# Patient Record
Sex: FEMALE | Race: WHITE | NOT HISPANIC OR LATINO | Employment: OTHER | URBAN - METROPOLITAN AREA
[De-identification: names, ages, dates, MRNs, and addresses within clinical notes are randomized per-mention and may not be internally consistent; named-entity substitution may affect disease eponyms.]

---

## 2023-12-19 ENCOUNTER — APPOINTMENT (EMERGENCY)
Dept: RADIOLOGY | Facility: HOSPITAL | Age: 73
End: 2023-12-19
Payer: MEDICARE

## 2023-12-19 ENCOUNTER — HOSPITAL ENCOUNTER (OUTPATIENT)
Facility: HOSPITAL | Age: 73
Setting detail: OBSERVATION
Discharge: HOME | End: 2023-12-20
Attending: INTERNAL MEDICINE | Admitting: INTERNAL MEDICINE
Payer: MEDICARE

## 2023-12-19 ENCOUNTER — APPOINTMENT (OUTPATIENT)
Dept: RADIOLOGY | Facility: HOSPITAL | Age: 73
End: 2023-12-19
Payer: MEDICARE
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
## 2023-12-19 DIAGNOSIS — R55 SYNCOPE AND COLLAPSE: ICD-10-CM

## 2023-12-19 PROBLEM — R56.9 WITNESSED SEIZURE-LIKE ACTIVITY (HCC): Status: ACTIVE | Noted: 2023-12-19

## 2023-12-19 PROBLEM — Z85.528 HISTORY OF RENAL CELL CANCER: Status: ACTIVE | Noted: 2023-12-19

## 2023-12-19 PROBLEM — E78.5 DYSLIPIDEMIA: Status: ACTIVE | Noted: 2023-12-19

## 2023-12-19 PROBLEM — E03.9 HYPOTHYROID: Status: ACTIVE | Noted: 2023-12-19

## 2023-12-19 LAB
2HR DELTA HS TROPONIN: 3 NG/L
ABO GROUP BLD: NORMAL
ALBUMIN SERPL BCP-MCNC: 3.9 G/DL (ref 3.5–5)
ALP SERPL-CCNC: 64 U/L (ref 34–104)
ALT SERPL W P-5'-P-CCNC: 8 U/L (ref 7–52)
ANION GAP SERPL CALCULATED.3IONS-SCNC: 8 MMOL/L
APTT PPP: 28 SECONDS (ref 23–37)
AST SERPL W P-5'-P-CCNC: 13 U/L (ref 13–39)
BASOPHILS # BLD AUTO: 0.03 THOUSANDS/ÂΜL (ref 0–0.1)
BASOPHILS NFR BLD AUTO: 0 % (ref 0–1)
BILIRUB SERPL-MCNC: 0.47 MG/DL (ref 0.2–1)
BLD GP AB SCN SERPL QL: NEGATIVE
BUN SERPL-MCNC: 25 MG/DL (ref 5–25)
CALCIUM SERPL-MCNC: 8.9 MG/DL (ref 8.4–10.2)
CARDIAC TROPONIN I PNL SERPL HS: 6 NG/L
CARDIAC TROPONIN I PNL SERPL HS: 9 NG/L
CHLORIDE SERPL-SCNC: 107 MMOL/L (ref 96–108)
CO2 SERPL-SCNC: 21 MMOL/L (ref 21–32)
CREAT SERPL-MCNC: 0.92 MG/DL (ref 0.6–1.3)
EOSINOPHIL # BLD AUTO: 0.02 THOUSAND/ÂΜL (ref 0–0.61)
EOSINOPHIL NFR BLD AUTO: 0 % (ref 0–6)
ERYTHROCYTE [DISTWIDTH] IN BLOOD BY AUTOMATED COUNT: 13.1 % (ref 11.6–15.1)
GFR SERPL CREATININE-BSD FRML MDRD: 61 ML/MIN/1.73SQ M
GLUCOSE SERPL-MCNC: 131 MG/DL (ref 65–140)
HCT VFR BLD AUTO: 42 % (ref 34.8–46.1)
HGB BLD-MCNC: 13.9 G/DL (ref 11.5–15.4)
IMM GRANULOCYTES # BLD AUTO: 0.06 THOUSAND/UL (ref 0–0.2)
IMM GRANULOCYTES NFR BLD AUTO: 1 % (ref 0–2)
INR PPP: 1.05 (ref 0.84–1.19)
LYMPHOCYTES # BLD AUTO: 0.7 THOUSANDS/ÂΜL (ref 0.6–4.47)
LYMPHOCYTES NFR BLD AUTO: 8 % (ref 14–44)
MCH RBC QN AUTO: 29.6 PG (ref 26.8–34.3)
MCHC RBC AUTO-ENTMCNC: 33.1 G/DL (ref 31.4–37.4)
MCV RBC AUTO: 89 FL (ref 82–98)
MONOCYTES # BLD AUTO: 0.28 THOUSAND/ÂΜL (ref 0.17–1.22)
MONOCYTES NFR BLD AUTO: 3 % (ref 4–12)
NEUTROPHILS # BLD AUTO: 8.21 THOUSANDS/ÂΜL (ref 1.85–7.62)
NEUTS SEG NFR BLD AUTO: 88 % (ref 43–75)
NRBC BLD AUTO-RTO: 0 /100 WBCS
PLATELET # BLD AUTO: 150 THOUSANDS/UL (ref 149–390)
PLATELET # BLD AUTO: 226 THOUSANDS/UL (ref 149–390)
PMV BLD AUTO: 10.3 FL (ref 8.9–12.7)
PMV BLD AUTO: 10.5 FL (ref 8.9–12.7)
POTASSIUM SERPL-SCNC: 4.5 MMOL/L (ref 3.5–5.3)
PROT SERPL-MCNC: 6.2 G/DL (ref 6.4–8.4)
PROTHROMBIN TIME: 13.6 SECONDS (ref 11.6–14.5)
RBC # BLD AUTO: 4.7 MILLION/UL (ref 3.81–5.12)
RH BLD: POSITIVE
SODIUM SERPL-SCNC: 136 MMOL/L (ref 135–147)
SPECIMEN EXPIRATION DATE: NORMAL
WBC # BLD AUTO: 9.3 THOUSAND/UL (ref 4.31–10.16)

## 2023-12-19 PROCEDURE — 99223 1ST HOSP IP/OBS HIGH 75: CPT | Performed by: INTERNAL MEDICINE

## 2023-12-19 PROCEDURE — 86850 RBC ANTIBODY SCREEN: CPT | Performed by: INTERNAL MEDICINE

## 2023-12-19 PROCEDURE — 70450 CT HEAD/BRAIN W/O DYE: CPT

## 2023-12-19 PROCEDURE — 86900 BLOOD TYPING SEROLOGIC ABO: CPT | Performed by: INTERNAL MEDICINE

## 2023-12-19 PROCEDURE — EDAIR PR ED AIR: Performed by: EMERGENCY MEDICINE

## 2023-12-19 PROCEDURE — 99285 EMERGENCY DEPT VISIT HI MDM: CPT

## 2023-12-19 PROCEDURE — 85049 AUTOMATED PLATELET COUNT: CPT | Performed by: INTERNAL MEDICINE

## 2023-12-19 PROCEDURE — 72125 CT NECK SPINE W/O DYE: CPT

## 2023-12-19 PROCEDURE — 76705 ECHO EXAM OF ABDOMEN: CPT | Performed by: SURGERY

## 2023-12-19 PROCEDURE — 84484 ASSAY OF TROPONIN QUANT: CPT | Performed by: INTERNAL MEDICINE

## 2023-12-19 PROCEDURE — 71045 X-RAY EXAM CHEST 1 VIEW: CPT

## 2023-12-19 PROCEDURE — 85730 THROMBOPLASTIN TIME PARTIAL: CPT | Performed by: INTERNAL MEDICINE

## 2023-12-19 PROCEDURE — 36415 COLL VENOUS BLD VENIPUNCTURE: CPT | Performed by: SURGERY

## 2023-12-19 PROCEDURE — 93308 TTE F-UP OR LMTD: CPT | Performed by: SURGERY

## 2023-12-19 PROCEDURE — 93005 ELECTROCARDIOGRAM TRACING: CPT

## 2023-12-19 PROCEDURE — 86901 BLOOD TYPING SEROLOGIC RH(D): CPT | Performed by: INTERNAL MEDICINE

## 2023-12-19 PROCEDURE — 85025 COMPLETE CBC W/AUTO DIFF WBC: CPT | Performed by: INTERNAL MEDICINE

## 2023-12-19 PROCEDURE — 80053 COMPREHEN METABOLIC PANEL: CPT | Performed by: INTERNAL MEDICINE

## 2023-12-19 PROCEDURE — 85610 PROTHROMBIN TIME: CPT | Performed by: INTERNAL MEDICINE

## 2023-12-19 RX ORDER — DOCUSATE SODIUM 100 MG/1
100 CAPSULE, LIQUID FILLED ORAL 2 TIMES DAILY
Status: DISCONTINUED | OUTPATIENT
Start: 2023-12-19 | End: 2023-12-20 | Stop reason: HOSPADM

## 2023-12-19 RX ORDER — ONDANSETRON 2 MG/ML
4 INJECTION INTRAMUSCULAR; INTRAVENOUS EVERY 6 HOURS PRN
Status: DISCONTINUED | OUTPATIENT
Start: 2023-12-19 | End: 2023-12-20 | Stop reason: HOSPADM

## 2023-12-19 RX ORDER — ACETAMINOPHEN 325 MG/1
650 TABLET ORAL EVERY 6 HOURS PRN
Status: DISCONTINUED | OUTPATIENT
Start: 2023-12-19 | End: 2023-12-20 | Stop reason: HOSPADM

## 2023-12-19 RX ORDER — ENOXAPARIN SODIUM 100 MG/ML
40 INJECTION SUBCUTANEOUS DAILY
Status: DISCONTINUED | OUTPATIENT
Start: 2023-12-20 | End: 2023-12-20 | Stop reason: HOSPADM

## 2023-12-19 RX ORDER — MAGNESIUM HYDROXIDE/ALUMINUM HYDROXICE/SIMETHICONE 120; 1200; 1200 MG/30ML; MG/30ML; MG/30ML
30 SUSPENSION ORAL EVERY 6 HOURS PRN
Status: DISCONTINUED | OUTPATIENT
Start: 2023-12-19 | End: 2023-12-20 | Stop reason: HOSPADM

## 2023-12-19 RX ADMIN — DOCUSATE SODIUM 100 MG: 100 CAPSULE, LIQUID FILLED ORAL at 22:14

## 2023-12-19 NOTE — H&P
[facility]  H&P  Name: Susan Costa 73 y.o. female I MRN: [id number]  Unit/Bed#: ED 15 I Date of Admission: 12/19/2023   Date of Service: 12/19/2023 I Hospital Day: 0      Assessment/Plan   * Syncope and collapse  Assessment & Plan  Presents with syncope and collapse.  Unknown etiology, reports associated diaphoresis feeling faint and passed out for unknown period of time.  Denies focal weakness.   Differentials include orthostatic versus cardiogenic versus vasovagal  Follow-up on cardiology evaluation, check orthostatics  Monitor      History of renal cell cancer  Assessment & Plan  S/p right nephrectomy  Outpatient follow-up    Dyslipidemia  Assessment & Plan  Patient reports she takes medication for high cholesterol and does not recollect the name at this time      Hypothyroid  Assessment & Plan  Patient reports taking thyroid replacement does not recollect the dose at this time    Fall  Assessment & Plan  Syncope collapse and fall eval by trauma service  Imaging does not reveal acute injuries  Fall precautions           VTE Pharmacologic Prophylaxis: VTE Score: 3 Moderate Risk (Score 3-4) - Pharmacological DVT Prophylaxis Ordered: enoxaparin (Lovenox).  Code Status: Full code  Discussion with family:  Discussed with the patient, called and updated son Adriel questions answered.     Anticipated Length of Stay: Patient will be admitted on an observation basis with an anticipated length of stay of less than 2 midnights secondary to syncope and collapse for evaluation as outlined.        Chief Complaint:     Syncope and collapse    History of Present Illness:  Susan Costa is a 73 y.o. female with a PMH of hypothyroidism, dyslipidemia, history of renal cancer s/p nephrectomy who presents with syncope and collapse.  Patient was at the [facility], she reports feeling faint lightheaded and diaphoretic.  She sat on a chair.  She continued to feel faint and diaphoretic and passed out  "for an unknown period of time, next she remembers EMS and presentation to the ED.  Presently reports feeling tired and fatigued and reports no energy.  She was evaluated by the trauma service, imaging reveals no acute injuries or traumatic abnormalities.  She is being admitted for further evaluation of syncope and collapse.    She reports feeling well before the episode.  She reports history of skipped heartbeats.  She reports she is on a blood thinner but does not recollect the name of the blood thinners.  She also is unable to recollect at this time her medications for hypothyroidism and dyslipidemia.    Last few days she reports feeling okay, \" at my usual\".  No history of fever chills sweats constitutional symptoms.  Denies chest pain shortness of breath cough chest tightness or wheezing.  Denies urinary symptoms.  Denies abdominal pain nausea vomiting or diarrhea.      Review of Systems:  Review of Systems   All other systems reviewed and are negative.      Past Medical and Surgical History:   No past medical history on file.    No past surgical history on file.    Meds/Allergies:  Prior to Admission medications    Not on File     I have reviewed home medications with patient personally.    Allergies: Not on File    Social History:  Marital Status:    Occupation: Retired  Patient Pre-hospital Living Situation: Home  Patient Pre-hospital Level of Mobility: walks  Patient Pre-hospital Diet Restrictions: no  Substance Use History:   Social History     Substance and Sexual Activity   Alcohol Use Not on file     Social History     Tobacco Use   Smoking Status Not on file   Smokeless Tobacco Not on file     Social History     Substance and Sexual Activity   Drug Use Not on file       Family History:  No family history on file.    Physical Exam:     Vitals:   Blood Pressure: 119/58 (12/19/23 1745)  Pulse: 74 (12/19/23 1745)  Temperature: 97.8 °F (36.6 °C) (12/19/23 1730)  Temp Source: Oral (12/19/23 " 1730)  Respirations: 18 (12/19/23 1745)  Weight - Scale: 65.5 kg (144 lb 6.4 oz) (12/19/23 1635)  SpO2: 99 % (12/19/23 1745)    Physical Exam       Comfortably in bed  Features of protein calorie malnutrition noted  Neck supple  Lungs emphysematous  Diminished breath sounds bilateral  Heart sounds S1 and S2 noted  No murmurs appreciable  Abdomen soft nontender  Awake and alert obey simple commands  No pedal edema  No rash      Additional Data:     Lab Results:  Results from last 7 days   Lab Units 12/19/23  1646   WBC Thousand/uL 9.30   HEMOGLOBIN g/dL 13.9   HEMATOCRIT % 42.0   PLATELETS Thousands/uL 226   NEUTROS PCT % 88*   LYMPHS PCT % 8*   MONOS PCT % 3*   EOS PCT % 0     Results from last 7 days   Lab Units 12/19/23  1646   SODIUM mmol/L 136   POTASSIUM mmol/L 4.5   CHLORIDE mmol/L 107   CO2 mmol/L 21   BUN mg/dL 25   CREATININE mg/dL 0.92   ANION GAP mmol/L 8   CALCIUM mg/dL 8.9   ALBUMIN g/dL 3.9   TOTAL BILIRUBIN mg/dL 0.47   ALK PHOS U/L 64   ALT U/L 8   AST U/L 13   GLUCOSE RANDOM mg/dL 131     Results from last 7 days   Lab Units 12/19/23  1646   INR  1.05                   Lines/Drains:  Invasive Devices       Peripheral Intravenous Line  Duration             Peripheral IV 12/19/23 Left;Ventral (anterior) Forearm <1 day                        Imaging: Reviewed radiology reports from this admission including: chest xray and CT head  TRAUMA - CT head wo contrast   Final Result by Wing Griggs MD (12/19 1707)         1. No acute intracranial hemorrhage or mass effect.   2. Right frontal scalp contusion. No acute calvarial fracture.         I personally discussed this study with Lauryn Ullrich on 12/19/2023 5:02 PM.                        Workstation performed: [id number]         TRAUMA - CT spine cervical wo contrast   Final Result by Wing Griggs MD (12/19 1707)      No evidence of acute cervical spine fracture or traumatic malalignment.         I personally discussed this study with Pennie  Ullrich on 12/19/2023 5:05 PM.                  Workstation performed: [id number]         XR Trauma multiple (SLB/SLRA trauma bay ONLY)   Final Result by Mickey Mcmahan MD (12/19 1720)      No portable radiographic evidence of acute thoracic injury.               Workstation performed: [id number]         XR chest 1 view    (Results Pending)       EKG and Other Studies Reviewed on Admission:   EKG:  Normal sinus rhythm.    ** Please Note: This note has been constructed using a voice recognition system. **

## 2023-12-19 NOTE — H&P
[facility]  H&P  Name: Susan Costa 73 y.o. female I MRN: [id number]  Unit/Bed#: TR 02 I Date of Admission: 12/19/2023   Date of Service: 12/19/2023 I Hospital Day: 0      Assessment/Plan   Syncope and collapse  Assessment & Plan  Witnessed fall  -recommend medicine admission for syncopal workup    Fall  Assessment & Plan  Witnessed fall  CTH and C spine  EKG  CBC, BMP             Trauma Alert: Level B   Model of Arrival: Ambulance    Trauma Team: Attending Garrick, Residents Danilo, and Fellow Sergio  Consultants:     None     History of Present Illness     Chief Complaint: lightheadedness, fall  Mechanism:Fall     HPI:    Susan Costa is a 73 y.o. female who presents as level B fall w/ possible LOC, and bystander described seizure activity. Pt was sitting and felt lightheaded, nauseated and dizzy. Unsure how she got to the ground. Unsure if she had a head strike. Felt somnolent. While in the ED GCS 15, alert, oriented answering questions appropriately. Pt reports on blood thinner no sure of name. Pt reports no fever, chills ns, no cp, palpitations, no wheezing or cough. No abd discomfort or bowel complaints, no urinary complaints. No previous fall hx. Lives w/ son, pt reports staying active and is able to care for herself alone. Shx nephrectomy d/t RCC.     Review of Systems   Constitutional:  Positive for diaphoresis. Negative for chills.   HENT: Negative.     Eyes: Negative.  Negative for visual disturbance.   Respiratory:  Negative for cough, shortness of breath and wheezing.    Cardiovascular: Negative.  Negative for chest pain and palpitations.   Gastrointestinal:  Negative for abdominal distention, constipation, diarrhea, nausea and vomiting.   Endocrine: Negative.    Genitourinary: Negative.    Musculoskeletal: Negative.    Skin: Negative.    Allergic/Immunologic: Negative.    Neurological:  Positive for syncope, weakness and light-headedness.   Hematological:  Negative.    Psychiatric/Behavioral: Negative.       12-point, complete review of systems was reviewed and negative except as stated above.     Historical Information     No past medical history on file.  No past surgical history on file.          Immunization History   Administered Date(s) Administered    COVID-19 PFIZER VACCINE 0.3 ML IM 04/04/2021, 04/22/2021     Last Tetanus: ***  Family History: Non-contributory    1. Before the illness or injury that brought you to the Emergency, did you need someone to help you on a regular basis? 0=No   2. Since the illness or injury that brought you to the Emergency, have you needed more help than usual to take care of yourself? 0=No   3. Have you been hospitalized for one or more nights during the past 6 months (excluding a stay in the Emergency Department)? 0=No   4. In general, do you see well? 0=Yes   5. In general, do you have serious problems with your memory? 0=No   6. Do you take more than three different medications everyday? 0=No   TOTAL   0     Did you order a geriatric consult if the score was 2 or greater?: no     Meds/Allergies   PTA meds:   None     Allergies have not been reviewed;  Not on File    Objective   Initial Vitals:   Temperature: (!) 96.7 °F (35.9 °C) (12/19/23 1635)  Pulse: 71 (12/19/23 1635)  Respirations: 18 (12/19/23 1635)  Blood Pressure: 109/68 (12/19/23 1635)    Primary Survey:   Airway:        Status: patent;                  Breathing:        Pre-hospital Interventions: none       Effort: normal       Right breath sounds: normal       Left breath sounds: normal  Circulation:        Rhythm: regular       Rate: regular   Right Pulses Left Pulses    R radial: 2+  R femoral: 2+  R pedal: 2+  R carotid: 2+   L radial: 2+  L femoral: 2+  L pedal: 2+  L carotid: 2+     Disability:        GCS: Eye: 4; Verbal: 5 Motor: 6 Total: 15       Right Pupil:       Left Pupil:     R Motor Strength L Motor Strength    R : 5/5  R dorsiflex: 5/5  R  "plantarflex: 5/5 L : 5/5  L dorsiflex: 5/5  L plantarflex: 5/5        Sensory:  No sensory deficit  Exposure:       Completed: Yes (no step off or deformities, no tenderness throughout C/T/L spine)      Secondary Survey:  Physical Exam  HENT:      Head: Normocephalic and atraumatic.      Nose: Nose normal.      Mouth/Throat:      Mouth: Mucous membranes are dry.   Eyes:      Extraocular Movements: Extraocular movements intact.      Conjunctiva/sclera: Conjunctivae normal.   Cardiovascular:      Rate and Rhythm: Normal rate.   Pulmonary:      Effort: Pulmonary effort is normal.   Abdominal:      General: Abdomen is flat. There is no distension.      Palpations: Abdomen is soft.      Comments: Minimally periumbically ttp pt reports has had this issue previously, no dx given.    Musculoskeletal:      Cervical back: Normal range of motion.   Skin:     General: Skin is warm.   Neurological:      General: No focal deficit present.      Mental Status: She is alert and oriented to person, place, and time.         Invasive Devices       Peripheral Intravenous Line  Duration             Peripheral IV 12/19/23 Left;Ventral (anterior) Forearm <1 day                  Lab Results: I have personally reviewed all pertinent laboratory/test results from 12/19/23, including the preceding 24 hours.  No results for input(s): \"WBC\", \"HGB\", \"HCT\", \"PLT\", \"BANDSPCT\", \"SODIUM\", \"K\", \"CL\", \"CO2\", \"BUN\", \"CREATININE\", \"GLUC\", \"CAIONIZED\", \"MG\", \"PHOS\", \"AST\", \"ALT\", \"ALB\", \"TBILI\", \"DBILI\", \"ALKPHOS\", \"PTT\", \"INR\", \"HSTNI0\", \"HSTNI2\", \"BNP\", \"LACTICACID\" in the last 72 hours.    Imaging Results: I have personally reviewed pertinent images saved in PACS. CT scan findings (and other pertinent positive findings on images) were discussed with radiology. My interpretation of the images/reports are as follows:      Other Studies: EKG pending    Code Status: No Order  Advance Directive and Living Will:      Power of :    POLST:    I have " spent 25 minutes with Patient  today in which greater than 50% of this time was spent in counseling/coordination of care regarding

## 2023-12-19 NOTE — ED PROVIDER NOTES
Emergency Department Airway Evaluation and Management Form    History  Obtained from: EMS      Chief Complaint:  Trauma Alert    HPI: Pt is a 73 y.o. female presents s/p loss of consciousness and possible seizure activity.  [place], then prior to being on the ground, felt lightheaded beforehand.  Bystanders reportedly thought that there was some slight seizure activity.  EMS arrived, patient  Acting appropriately, says she took a blood thinner but was unsure which it was    I have reviewed and agree with the history as documented.    Allergies  Allergies: see nurses notes    Physical Exam  Vital signs per nursing notes.    Supplemental Oxygen: None    GCS: 15    Neuro: Alert and oriented x 3  Psych: not combative, not anxious, cooperative for exam  Neck: In collar, No JVD, No midline tenderness  Respiratory: Clear to auscultation  Mouth: No signs of trauma  Pharynx: Patent        ED Medications given  See nursing notes    Intubation    No intubation required    Final Diagnosis:  Chronic anticoagulation, acute closed head injury, acute loss of consciousness    ED Provider  Electronically Signed by       Toan Wong DO  12/19/23 1640

## 2023-12-19 NOTE — ASSESSMENT & PLAN NOTE
Patient reports she takes medication for high cholesterol and does not recollect the name at this time

## 2023-12-19 NOTE — ASSESSMENT & PLAN NOTE
Syncope collapse and fall eval by trauma service  Imaging does not reveal acute injuries  Fall precautions

## 2023-12-19 NOTE — PROCEDURES
POC FAST US    Date/Time: 12/19/2023 5:02 PM    Performed by: Hung Carrasco MD  Authorized by: Hung Carrasco MD    Patient location:  ED  Procedure details:     Exam Type:  Diagnostic    Assess for:  Intra-abdominal fluid and pericardial effusion    Technique: FAST      Views obtained:  Heart - Pericardial sac, RUQ - Huffman's Pouch, LUQ - Splenorenal space and Suprapubic - Pouch of Jose    Image quality: diagnostic      Image availability:  Video obtained  FAST Findings:     RUQ (Hepatorenal) free fluid: absent      LUQ (Splenorenal) free fluid: absent      Suprapubic free fluid: absent      Cardiac wall motion: identified      Pericardial effusion: absent    Interpretation:     Impressions: negative

## 2023-12-20 VITALS
RESPIRATION RATE: 19 BRPM | HEART RATE: 66 BPM | SYSTOLIC BLOOD PRESSURE: 94 MMHG | WEIGHT: 144.4 LBS | OXYGEN SATURATION: 98 % | TEMPERATURE: 97.9 F | DIASTOLIC BLOOD PRESSURE: 58 MMHG

## 2023-12-20 LAB
ANION GAP SERPL CALCULATED.3IONS-SCNC: 5 MMOL/L
ATRIAL RATE: 68 BPM
BASOPHILS # BLD AUTO: 0.03 THOUSANDS/ÂΜL (ref 0–0.1)
BASOPHILS NFR BLD AUTO: 0 % (ref 0–1)
BUN SERPL-MCNC: 22 MG/DL (ref 5–25)
CALCIUM SERPL-MCNC: 9 MG/DL (ref 8.4–10.2)
CHLORIDE SERPL-SCNC: 107 MMOL/L (ref 96–108)
CO2 SERPL-SCNC: 27 MMOL/L (ref 21–32)
CREAT SERPL-MCNC: 0.92 MG/DL (ref 0.6–1.3)
EOSINOPHIL # BLD AUTO: 0.15 THOUSAND/ÂΜL (ref 0–0.61)
EOSINOPHIL NFR BLD AUTO: 2 % (ref 0–6)
ERYTHROCYTE [DISTWIDTH] IN BLOOD BY AUTOMATED COUNT: 13.2 % (ref 11.6–15.1)
GFR SERPL CREATININE-BSD FRML MDRD: 61 ML/MIN/1.73SQ M
GLUCOSE P FAST SERPL-MCNC: 86 MG/DL (ref 65–99)
GLUCOSE SERPL-MCNC: 86 MG/DL (ref 65–140)
HCT VFR BLD AUTO: 41.1 % (ref 34.8–46.1)
HGB BLD-MCNC: 13.5 G/DL (ref 11.5–15.4)
IMM GRANULOCYTES # BLD AUTO: 0.01 THOUSAND/UL (ref 0–0.2)
IMM GRANULOCYTES NFR BLD AUTO: 0 % (ref 0–2)
LYMPHOCYTES # BLD AUTO: 1.77 THOUSANDS/ÂΜL (ref 0.6–4.47)
LYMPHOCYTES NFR BLD AUTO: 27 % (ref 14–44)
MCH RBC QN AUTO: 29.3 PG (ref 26.8–34.3)
MCHC RBC AUTO-ENTMCNC: 32.8 G/DL (ref 31.4–37.4)
MCV RBC AUTO: 89 FL (ref 82–98)
MONOCYTES # BLD AUTO: 0.59 THOUSAND/ÂΜL (ref 0.17–1.22)
MONOCYTES NFR BLD AUTO: 9 % (ref 4–12)
NEUTROPHILS # BLD AUTO: 4.14 THOUSANDS/ÂΜL (ref 1.85–7.62)
NEUTS SEG NFR BLD AUTO: 62 % (ref 43–75)
NRBC BLD AUTO-RTO: 0 /100 WBCS
P AXIS: 69 DEGREES
PLATELET # BLD AUTO: 258 THOUSANDS/UL (ref 149–390)
PMV BLD AUTO: 10.6 FL (ref 8.9–12.7)
POTASSIUM SERPL-SCNC: 4.1 MMOL/L (ref 3.5–5.3)
PR INTERVAL: 164 MS
QRS AXIS: 71 DEGREES
QRSD INTERVAL: 84 MS
QT INTERVAL: 414 MS
QTC INTERVAL: 440 MS
RBC # BLD AUTO: 4.6 MILLION/UL (ref 3.81–5.12)
SODIUM SERPL-SCNC: 139 MMOL/L (ref 135–147)
T WAVE AXIS: 78 DEGREES
VENTRICULAR RATE: 68 BPM
WBC # BLD AUTO: 6.69 THOUSAND/UL (ref 4.31–10.16)

## 2023-12-20 PROCEDURE — 99205 OFFICE O/P NEW HI 60 MIN: CPT | Performed by: INTERNAL MEDICINE

## 2023-12-20 PROCEDURE — 97161 PT EVAL LOW COMPLEX 20 MIN: CPT

## 2023-12-20 PROCEDURE — 99239 HOSP IP/OBS DSCHRG MGMT >30: CPT | Performed by: NURSE PRACTITIONER

## 2023-12-20 PROCEDURE — 97166 OT EVAL MOD COMPLEX 45 MIN: CPT

## 2023-12-20 PROCEDURE — 85025 COMPLETE CBC W/AUTO DIFF WBC: CPT | Performed by: INTERNAL MEDICINE

## 2023-12-20 PROCEDURE — 80048 BASIC METABOLIC PNL TOTAL CA: CPT | Performed by: INTERNAL MEDICINE

## 2023-12-20 RX ADMIN — DOCUSATE SODIUM 100 MG: 100 CAPSULE, LIQUID FILLED ORAL at 08:39

## 2023-12-20 NOTE — PHYSICAL THERAPY NOTE
PHYSICAL THERAPY EVALUATION  NAME:  Susan Costa  DATE: 12/20/23    AGE:   73 y.o.  Mrn:   [id number]  ADMIT DX:  Syncope and collapse [R55]  Fall, initial encounter [W19.XXXA]  Unspecified multiple injuries, initial encounter [T07.XXXA]  Problem List:   Patient Active Problem List   Diagnosis    Fall    Syncope and collapse    Hypothyroid    Dyslipidemia    History of renal cell cancer       Past Medical History  No past medical history on file.    Past Surgical History  No past surgical history on file.    Length Of Stay: 0  Performed at least 2 patient identifiers during session: Name and Birthday       12/20/23 1153   PT Last Visit   PT Visit Date 12/20/23   Note Type   Note type Evaluation   Pain Assessment   Pain Score No Pain   Restrictions/Precautions   Weight Bearing Precautions Per Order No   Other Precautions Telemetry   Home Living   Type of Home House   Home Layout Two level;Performs ADLs on one level;Able to live on main level with bedroom/bathroom  (5 ANUJA)   Bathroom Shower/Tub Tub/shower unit   Bathroom Toilet Standard   Bathroom Equipment   (none reported)   Home Equipment   (no AD used at baseline)   Prior Function   Level of [place] Independent with ADLs;Independent with functional mobility;Independent with IADLS   Lives With Son   Receives Help From Family   IADLs Independent with medication management;Independent with meal prep;Independent with driving   Falls in the last 6 months 1 to 4  (1)   Vocational Retired   General   Family/Caregiver Present No   Cognition   Overall Cognitive Status WFL   Arousal/Participation Alert   Attention Within functional limits   Orientation Level Oriented X4   Memory Within functional limits   Following Commands Follows all commands and directions without difficulty   RUE Assessment   RUE Assessment WFL   LUE Assessment   LUE Assessment WFL   RLE Assessment   RLE Assessment WFL   LLE Assessment   LLE Assessment WFL   Vision-Basic Assessment   Current  Vision Wears glasses only for reading   Coordination   Sensation WFL   Bed Mobility   Supine to Sit 6  Modified independent   Sit to Supine 6  Modified independent   Transfers   Sit to Stand 6  Modified independent   Stand to Sit 6  Modified independent   Toilet transfer 6  Modified independent   Ambulation/Elevation   Gait pattern WNL   Gait Assistance 6  Modified independent   Assistive Device None   Ambulation/Elevation Additional Comments pt able to ambulate in the room to/from bathroom without difficulty   Balance   Static Sitting Normal   Dynamic Sitting Normal   Static Standing Good   Dynamic Standing Good   Ambulatory Good   Activity Tolerance   Activity Tolerance Patient tolerated treatment well   Assessment   Prognosis Excellent   Assessment Pt is 73 y.o. female seen for PT evaluation s/p admit to [facility] 12/19/2023 w/ Syncope and collapse. PT consulted to assess pt's functional mobility and d/c needs. Order placed for PT eval and tx, w/ up and OOB as tolerated order. Pt agreeable to PT  session upon arrival, pt found supine in bed. The following objective measures performed on IE also reveal limitations: AM-PAC 6 Clicks 24/24.  Patient was independent w/ all functional mobility w/ no AD.  Pt presents at [facility] with transfers, ambulation, and bed mobility.  From PT/mobility standpoint, recommendation at time of d/c would be anticipate no needs. Upon conclusion pt supine in bed. D/c PT services at this time. Complexity: Comorbidities affecting pt's physical performance at time of assessment include:  fall . Personal factors affecting pt at time of IE include: active prior to admission. Please find objective findings from PT assessment regarding body systems outlined above with impairments and limitations including fall risk.  Pt's clinical presentation is currently stable  seen in pt's presentation of  lack fof deficits . The patient's AM-PAC Basic Mobility Inpatient Short Form Raw Score is  24. A Raw score of  greater than 16 suggests the patient may benefit from discharge to home. Please also refer to the recommendation of the Physical Therapist for safe discharge planning.   Barriers to Discharge None   Goals   Patient Goals to go home   Discharge Recommendation   Rehab Resource Intensity Level, PT No post-acute rehabilitation needs   AM-PAC Basic Mobility Inpatient   Turning in Flat Bed Without Bedrails 4   Lying on Back to Sitting on Edge of Flat Bed Without Bedrails 4   Moving Bed to Chair 4   Standing Up From Chair Using Arms 4   Walk in Room 4   Climb 3-5 Stairs With Railing 4   Basic Mobility Inpatient Raw Score 24   Basic Mobility Standardized Score 57.68   Highest Level Of Mobility   JH-HLM Goal 8: Walk 250 feet or more   JH-HLM Achieved 7: Walk 25 feet or more         Time In: 1145  Time Out: 1153  Total Evaluation Minutes: 8    Maureen Fierro, PT

## 2023-12-20 NOTE — OCCUPATIONAL THERAPY NOTE
Occupational Therapy Evaluation     Patient Name: Susan Costa  Today's Date: 12/20/2023  Problem List  Principal Problem:    Syncope and collapse  Active Problems:    Fall    Past Medical History  No past medical history on file.  Past Surgical History  No past surgical history on file.      12/20/23 0940   OT Last Visit   OT Visit Date 12/20/23   Note Type   Note type Evaluation   Pain Assessment   Pain Assessment Tool 0-10   Pain Score No Pain   Restrictions/Precautions   Weight Bearing Precautions Per Order No   Other Precautions Multiple lines   Home Living   Type of Home House   Home Layout Two level;Performs ADLs on one level;Able to live on main level with bedroom/bathroom  (5 ANUJA)   Bathroom Shower/Tub Tub/shower unit   Bathroom Toilet Standard   Bathroom Equipment Other (Comment)  (denies any)   Home Equipment Other (Comment)  (denies any)   Additional Comments Pt reports living in 2 SH, 1st floor setup, 5 ANUJA   Prior Function   Level of [place] Independent with ADLs;Independent with functional mobility;Independent with IADLS   Lives With Son   Receives Help From Family   IADLs Independent with medication management;Independent with meal prep;Independent with driving   Falls in the last 6 months 1 to 4  (1)   Vocational Retired   Comments (+)    Lifestyle   Autonomy I w/ ADLS/IADLS, transfers and functional mobility PTA   Reciprocal Relationships Pt lives w/ her son who normally works   Service to Others retired; works in claims   Intrinsic Gratification going to the [employer]   ADL   Eating Assistance 7  Independent   Grooming Assistance 7  Independent   UB Bathing Assistance 7  Independent   LB Bathing Assistance 5  Supervision/Setup   UB Dressing Assistance 7  Independent   LB Dressing Assistance 5  Supervision/Setup   Toileting Assistance  5  Supervision/Setup   Functional Assistance 5  Supervision/Setup   Bed Mobility   Supine to Sit 6  Modified independent   Sit to Supine 6  Modified  independent   Additional Comments BP /71   Transfers   Sit to Stand 5  Supervision   Stand to Sit 5  Supervision   Additional Comments no AD/DME used; BP in standing 121/74   Functional Mobility   Functional Mobility 5  Supervision   Additional Comments pt engaged in short household distance functional mobility w/ S;no AD/DME used   Balance   Static Sitting Normal   Dynamic Sitting Good   Static Standing Good   Dynamic Standing Good   Ambulatory Good   Activity Tolerance   Activity Tolerance Patient tolerated treatment well   Medical Staff Made Aware PT   Nurse Made Aware yes   RUE Assessment   RUE Assessment WFL   LUE Assessment   LUE Assessment WFL   Hand Function   Gross Motor Coordination Functional   Fine Motor Coordination Functional   Sensation   Light Touch No apparent deficits   Cognition   Overall Cognitive Status WFL   Arousal/Participation Responsive;Cooperative   Attention Within functional limits   Orientation Level Oriented X4   Memory Within functional limits   Following Commands Follows all commands and directions without difficulty   Comments pt is pleasant and cooperative   Assessment   Limitation Decreased endurance;Decreased high-level ADLs   Prognosis Fair   Assessment Pt is a 74 y/o female seen for OT eval s/p adm to [facility] w/ syncope and collapse. Pt  has no past medical history on file.. Pt with active OT orders and activity as tolerated orders. Pt lives with her son in 2 , [facility], 1st floor setup. Pt was I w/  ADLS and IADLS, drove, & required no use of DME PTA. Pt is not currently demonstrating any significant deficits in occupational performance at this time. Overall is functioning at a S/I level for all functional tasks w/o use of AD/DME. Pt has no concerns regarding D/C home once medically stable. Pt has no further immediate acute skilled OT needs. Recommend D/C home w/ family support once medically stable. Will D/C OT at this time.   Goals   Patient Goals to go home   Discharge  Recommendation   Rehab Resource Intensity Level, OT No post-acute rehabilitation needs   Additional Comments  The patient's raw score on the AM-PAC Daily Activity Inpatient Short Form is 24. A raw score of greater than or equal to 19 suggests the patient may benefit from discharge to home. Please refer to the recommendation of the Occupational Therapist for safe discharge planning.   AM-PAC Daily Activity Inpatient   Lower Body Dressing 4   Bathing 4   Toileting 4   Upper Body Dressing 4   Grooming 4   Eating 4   Daily Activity Raw Score 24   Daily Activity Standardized Score (Calc for Raw Score >=11) 57.54   AM-PAC Applied Cognition Inpatient   Following a Speech/Presentation 4   Understanding Ordinary Conversation 4   Taking Medications 4   Remembering Where Things Are Placed or Put Away 4   Remembering List of 4-5 Errands 4   Taking Care of Complicated Tasks 4   Applied Cognition Raw Score 24   Applied Cognition Standardized Score 62.21   End of Consult   Education Provided Yes   Patient Position at End of Consult Supine;All needs within reach   Nurse Communication Nurse aware of consult       Joanne Raymond MS, OTR/L

## 2023-12-20 NOTE — ASSESSMENT & PLAN NOTE
Patient presented to the ED with complaints of syncope with collapse; reports feeling of diaphoresis and feeling faint.  Passed out for unknown amount of time.  Cardiology consult, appreciate input  Orthostatic vital signs completed, negative.  CT imaging of head and cervical spine are negative for trauma or acute findings  Lab work thus far is negative, troponin levels negative x 3

## 2023-12-20 NOTE — ASSESSMENT & PLAN NOTE
Presents with syncope and collapse.  Unknown etiology, reports associated diaphoresis feeling faint and passed out for unknown period of time.  Denies focal weakness.   Differentials include orthostatic versus cardiogenic versus vasovagal  Follow-up on cardiology evaluation, check orthostatics  Monitor

## 2023-12-20 NOTE — DISCHARGE SUMMARY
[facility]  Discharge- Susan Costa 1950, 73 y.o. female MRN: [id number]  Unit/Bed#: CW2 215-02 Encounter: [id number]  Primary Care Provider: No primary care provider on file.   Date and time admitted to hospital: 12/19/2023  4:34 PM    * Syncope and collapse  Assessment & Plan  Patient presented to the ED with complaints of syncope with collapse; reports feeling of diaphoresis and feeling faint.  Passed out for unknown amount of time.  Cardiology consult, appreciate input  Orthostatic vital signs completed, negative.  CT imaging of head and cervical spine are negative for trauma or acute findings  Lab work thus far is negative, troponin levels negative x 3    Fall  Assessment & Plan  Evaluated by trauma on admission  Fall precautions        Medical Problems       Resolved Problems  Date Reviewed: 12/20/2023   None       Discharging Physician / Practitioner: JORDAN Gonzalez  PCP: No primary care provider on file.  Admission Date:   Admission Orders (From admission, onward)       Ordered        12/19/23 1757  Place in Observation  Once                          Discharge Date: 12/20/23    Consultations During Hospital Stay:  IP CONSULT TO CARDIOLOGY    Procedures Performed:   None    Significant Findings / Test Results:   TRAUMA - CT head wo contrast   Final Result by Wing Griggs MD (12/19 1707)         1. No acute intracranial hemorrhage or mass effect.   2. Right frontal scalp contusion. No acute calvarial fracture.         I personally discussed this study with Lauryn Ullrich on 12/19/2023 5:02 PM.                        Workstation performed: [id number]         TRAUMA - CT spine cervical wo contrast   Final Result by Wing Griggs MD (12/19 1707)      No evidence of acute cervical spine fracture or traumatic malalignment.         I personally discussed this study with Lauryn Ullrich on 12/19/2023 5:05 PM.                  Workstation performed:  [id number]         XR Trauma multiple ([facility] trauma bay ONLY)   Final Result by Mickey Mcmahan MD (12/19 1720)      No portable radiographic evidence of acute thoracic injury.               Workstation performed: [id number]         XR chest 1 view    (Results Pending)       Incidental Findings:   None     Test Results Pending at Discharge (will require follow up):   None     Outpatient Tests Requested:  Follow up with PCP within 1 week    Complications:  None    Reason for Admission: Syncope    Hospital Course:   Susan Costa is a 73 y.o. female patient with past medical history of hypothyroidism and dyslipidemia who originally presented to the hospital on 12/19/2023 due to episode of syncope and collapse [place] the [place].  Patient states that she was sitting at the slot machine when she became hot, diaphoretic with nausea and ended up on the floor.  Reports that she only had 2 cups of coffee in the morning and early dinner the night before and had not had any water or food prior to.  Questionable hypoglycemic event however blood sugar on admission was 131.  Lab work stable, creatinine 0.92.  Orthostatic vital signs were done which were negative.  Cardiology evaluated the patient with no inpatient recommendations.  Follow-up outpatient.  Troponin levels were negative x 3.  CT head without any acute findings, CT cervical spine also without any acute findings.  Patient is stable for discharge      Please see above list of diagnoses and related plan for additional information.     Condition at Discharge: stable    Discharge Day Visit / Exam:   Subjective:  Patient resting in bed, reports she is feeling better and is ready to go home.    Vitals: Blood Pressure: 94/58 (12/20/23 1045)  Pulse: 66 (12/20/23 1045)  Temperature: 97.9 °F (36.6 °C) (12/20/23 1045)  Temp Source: Oral (12/20/23 1045)  Respirations: 19 (12/20/23 1045)  Weight - Scale: 65.5 kg (144 lb 6.4 oz) (12/19/23 1635)  SpO2: 98 % (12/20/23  1045)    Exam:   Physical Exam  Vitals and nursing note reviewed.   Constitutional:       General: She is not in acute distress.     Appearance: She is normal weight. She is not ill-appearing.   Cardiovascular:      Rate and Rhythm: Normal rate.      Pulses: Normal pulses.      Heart sounds: Normal heart sounds.   Pulmonary:      Effort: Pulmonary effort is normal.      Breath sounds: Normal breath sounds.   Abdominal:      General: Bowel sounds are normal.      Palpations: Abdomen is soft.   Musculoskeletal:         General: Normal range of motion.   Skin:     General: Skin is warm and dry.   Neurological:      Mental Status: She is alert and oriented to person, place, and time.   Psychiatric:         Mood and Affect: Mood normal.          Discussion with Family: Patient declined call to .     Discharge instructions/Information to patient and family:   See after visit summary for information provided to patient and family.      Provisions for Follow-Up Care:  See after visit summary for information related to follow-up care and any pertinent home health orders.      Mobility at time of Discharge:   Basic Mobility Inpatient Raw Score: 24  JH-HLM Goal: 8: Walk 250 feet or more  JH-HLM Achieved: 7: Walk 25 feet or more  HLM Goal achieved. Continue to encourage appropriate mobility.     Disposition:   Home    Planned Readmission: None     Discharge Statement:  I spent 45 minutes discharging the patient. This time was spent on the day of discharge. I had direct contact with the patient on the day of discharge. Greater than 50% of the total time was spent examining patient, answering all patient questions, arranging and discussing plan of care with patient as well as directly providing post-discharge instructions.  Additional time then spent on discharge activities.    Discharge Medications:  See after visit summary for reconciled discharge medications provided to patient and/or family.      **Please Note:  This note may have been constructed using a voice recognition system**

## 2023-12-20 NOTE — CASE MANAGEMENT
Case Management Discharge Planning Note    Patient name Susan Costa  Location CW2 215/CW2 215-02 MRN [id number]  : 1950 Date 2023       Current Admission Date: 2023  Current Admission Diagnosis:Syncope and collapse   Patient Active Problem List    Diagnosis Date Noted    Fall 2023    Syncope and collapse 2023    Hypothyroid 2023    Dyslipidemia 2023    History of renal cell cancer 2023      LOS (days): 0  Geometric Mean LOS (GMLOS) (days):   Days to GMLOS:     OBJECTIVE:            Current admission status: Observation   Preferred Pharmacy:   [facility] - [place], [place] - [address] [place] [id number]  Phone: [phone] Fax: [phone]    Primary Care Provider: No primary care provider on file.    Primary Insurance: MEDICARE  Secondary Insurance: [employer]    DISCHARGE DETAILS:  No rehab needs per therapy    CM notified by [facility] providerElissa pt is medically cleared for d/c but will need a ride  CM met with patient to discuss same. She reports she will need a ride to [employer] so she can take bus to [place].   Pt signed [employer] waiver. She is insisting on paying for ride  CM will arrange same when nurse notifies of time.

## 2023-12-20 NOTE — CONSULTS
"    Reason for Consult / Principal Problem:Syncope    Physician Requesting Consult:  Enid Beal MD    Cardiologist: none        Assessment and Plan      Current Problem List   Principal Problem:    Syncope and collapse  Active Problems:    Fall    Hypothyroid    Dyslipidemia    History of renal cell cancer    Assessment/Plan:    1.  Syncope  -Appears to be vasovagal in nature as patient did not need eat for roughly 24 hours prior  -Clines any prior episodes or cardiac history  -Did recommend an echo during this admission but patient declined stating that she would get 1 done with her PCP on discharge  From a cardiac standpoint she is okay to discharge.  Would recommend getting an echo and heart monitor as an outpatient        Subjective     CC: Syncope    HPI: Susan Costa 73 y.o. year old female with past medical history of hypothyroidism, hyperlipidemia, renal carcinoma (s/p nephrectomy) who presents with syncope.  Patient states she was at the casino last night and prior to that she did not eat for over 24 hours and only had a couple coffee to drink within that time as well.  Patient states that she was walking towards a restaurant to get food and started feeling off.\"  Patient states from there she started sweating and sat down in the Nexium treatment versus people talk to her.  Patient denies any sort of palpitations or chest pain during episode.  Patient denies cardiac history and states she does have a PCP in [place].    Denies chest pain, shortness of breath, palpitations, orthopnea, PND, pedal edema, syncope, presyncope, diaphoresis, nausea/vomiting      Remainder of ROS done and negative    Telemetry: No events    EKG: NSR      Family History: non-contributory  Historical Information   No past medical history on file.  No past surgical history on file.  Social History   Social History     Substance and Sexual Activity   Alcohol Use Not on file     Social History     Substance and Sexual Activity "   Drug Use Not on file     Social History     Tobacco Use   Smoking Status Not on file   Smokeless Tobacco Not on file     Family History: No family history on file.    Review of Systems:  Review of Systems        Scheduled Meds:  Current Facility-Administered Medications   Medication Dose Route Frequency Provider Last Rate    acetaminophen  650 mg Oral Q6H PRN Ta Esquivel MD      aluminum-magnesium hydroxide-simethicone  30 mL Oral Q6H PRN Ta Esquivel MD      docusate sodium  100 mg Oral BID Ta Esquivel MD      enoxaparin  40 mg Subcutaneous Daily Ta Esquivel MD      ondansetron  4 mg Intravenous Q6H PRN Ta Esquievl MD       Continuous Infusions:   PRN Meds:.  acetaminophen    aluminum-magnesium hydroxide-simethicone    ondansetron  all current active meds have been reviewed    Not on File    Objective   Vitals: Temp (24hrs), Av.6 °F (36.4 °C), Min:96.7 °F (35.9 °C), Max:97.9 °F (36.6 °C)  Current: Temperature: 97.9 °F (36.6 °C)  Patient Vitals for the past 24 hrs:   BP Temp Temp src Pulse Resp SpO2 Weight   23 0706 114/64 97.9 °F (36.6 °C) -- 62 18 97 % --   23 0544 116/64 -- -- 66 -- 98 % --   23 0505 117/63 -- -- 69 -- 100 % --   23 0301 117/63 -- -- -- -- -- --   23 2328 112/61 97.8 °F (36.6 °C) Oral 71 -- 98 % --   23 137/79 97.8 °F (36.6 °C) Oral 90 -- 100 % --   23 129/68 -- -- 73 -- 99 % --   23 132/66 -- -- -- -- -- --   23 1915 131/62 -- -- 74 18 100 % --   12/19/23 1745 119/58 -- -- 74 18 99 % --   23 1730 125/60 97.8 °F (36.6 °C) Oral 72 18 99 % --   23 1715 125/58 -- -- 72 (!) 10 98 % --   23 1700 127/59 -- -- 72 18 100 % --   23 1645 130/60 -- -- 67 18 98 % --   23 1642 118/57 -- -- 71 17 99 % --   23 1635 109/68 (!) 96.7 °F (35.9 °C) Tympanic 71 18 99 % 65.5 kg (144 lb 6.4 oz)    There is no height or weight on file to calculate  BMI.  Orthostatic Blood Pressures      Flowsheet Row Most Recent Value   Blood Pressure 114/64 filed at 12/20/2023 0706   Patient Position - Orthostatic VS Lying filed at 12/20/2023 0706                Invasive Devices       Peripheral Intravenous Line  Duration             Peripheral IV 12/20/23 Right;Ventral (anterior) Forearm <1 day                    Physical Exam:  Physical Exam  Vitals reviewed.   Constitutional:       Appearance: Normal appearance.   HENT:      Head: Normocephalic and atraumatic.      Mouth/Throat:      Mouth: Mucous membranes are moist.      Pharynx: Oropharynx is clear.   Cardiovascular:      Rate and Rhythm: Normal rate and regular rhythm.   Pulmonary:      Effort: Pulmonary effort is normal.      Breath sounds: Normal breath sounds.   Abdominal:      General: Abdomen is flat. Bowel sounds are normal. There is no distension.      Palpations: There is no mass.   Musculoskeletal:      Right lower leg: No edema.      Left lower leg: No edema.   Skin:     General: Skin is warm and dry.   Neurological:      Mental Status: She is alert and oriented to person, place, and time.   Psychiatric:         Mood and Affect: Mood normal.         Behavior: Behavior normal.             Lab Results:   Results from last 7 days   Lab Units 12/20/23  0602 12/19/23  1921 12/19/23  1646   WBC Thousand/uL 6.69  --  9.30   HEMOGLOBIN g/dL 13.5  --  13.9   HEMATOCRIT % 41.1  --  42.0   PLATELETS Thousands/uL 258 150 226   NEUTROS PCT % 62  --  88*   MONOS PCT % 9  --  3*   EOS PCT % 2  --  0      Results from last 7 days   Lab Units 12/20/23  0604 12/19/23  1646   SODIUM mmol/L 139 136   POTASSIUM mmol/L 4.1 4.5   CHLORIDE mmol/L 107 107   CO2 mmol/L 27 21   BUN mg/dL 22 25   CREATININE mg/dL 0.92 0.92   CALCIUM mg/dL 9.0 8.9   ALK PHOS U/L  --  64   ALT U/L  --  8   AST U/L  --  13   INR   --  1.05   PTT seconds  --  28   EGFR ml/min/1.73sq m 61 61     Results from last 7 days   Lab Units 12/19/23  1646   INR  1.05  "  PTT seconds 28             No results found for: \"PHART\", \"GQN9BGY\", \"PO2ART\", \"UVR9LTW\", \"B3CFOOFK\", \"BEART\", \"SOURCE\"  No components found for: \"HIV1X2\"  No results found for: \"HAV\", \"HEPAIGM\", \"HEPBIGM\", \"HEPBCAB\", \"HBEAG\", \"HEPCAB\"  No results found for: \"SPEP\", \"UPEP\" No results found for: \"HGBA1C\"  No results found for: \"CHOL\" No results found for: \"HDL\" No results found for: \"LDLCALC\" No results found for: \"TRIG\"  No components found for: \"PROCAL\"          Imaging: I have personally reviewed pertinent reports.        Delroy Guadalupe MD  [facility]  Internal Medicine Residency PGY-2         "